# Patient Record
Sex: MALE | Race: WHITE | Employment: OTHER | RURAL
[De-identification: names, ages, dates, MRNs, and addresses within clinical notes are randomized per-mention and may not be internally consistent; named-entity substitution may affect disease eponyms.]

---

## 2017-07-06 ENCOUNTER — OFFICE VISIT (OUTPATIENT)
Dept: FAMILY MEDICINE CLINIC | Age: 77
End: 2017-07-06

## 2017-07-06 VITALS
TEMPERATURE: 96.2 F | DIASTOLIC BLOOD PRESSURE: 74 MMHG | BODY MASS INDEX: 23.34 KG/M2 | WEIGHT: 157.6 LBS | HEIGHT: 69 IN | OXYGEN SATURATION: 99 % | RESPIRATION RATE: 18 BRPM | SYSTOLIC BLOOD PRESSURE: 147 MMHG | HEART RATE: 51 BPM

## 2017-07-06 DIAGNOSIS — L57.0 AK (ACTINIC KERATOSIS): Primary | ICD-10-CM

## 2017-07-06 NOTE — PROGRESS NOTES
Chief Complaint   Patient presents with    Facial Injury     spot on right side of face. Noticed about a year ago, hasn't grown or decreased in size. Has tried OTC creams but nothing seems to help. HPI:      Samantha Dailey is a 78 y.o. male. Hx of prostate cancer. He had a robot-assisted laparoscopic prostatectomy  with pelvic lymph node dissection in 2010. New Issues:  Has a spot on right side of face. Noticed about a year ago, hasn't grown or decreased in size. Has tried OTC creams but nothing seems to help. Has not seen a dermatologist s/t difficulty getting an appointment. Allergies   Allergen Reactions    Sulfa (Sulfonamide Antibiotics) Anaphylaxis       Current Outpatient Prescriptions   Medication Sig    ibuprofen (MOTRIN) 200 mg tablet Take  by mouth every six (6) hours as needed for Pain. No current facility-administered medications for this visit. Past Medical History:   Diagnosis Date    Cancer [facility]) 2011    Prostate       Past Surgical History:   Procedure Laterality Date    HX COLONOSCOPY  2000    HX HERNIA REPAIR      HX PROSTATECTOMY      HX TONSILLECTOMY         Social History     Social History    Marital status:      Spouse name: N/A    Number of children: N/A    Years of education: N/A     Social History Main Topics    Smoking status: Never Smoker    Smokeless tobacco: None    Alcohol use 0.0 oz/week     0 Standard drinks or equivalent per week    Drug use: No    Sexual activity: Not Asked     Other Topics Concern    None     Social History Narrative       No family history on file. Above history reviewed. ROS:  Denies fever, chills, cough, chest pain, SOB,  nausea, vomiting, or diarrhea. Denies wt loss, wt gain, hemoptysis, hematochezia or melena.     Physical Examination:    /74 (BP 1 Location: Right arm, BP Patient Position: Sitting)  Pulse (!) 51  Temp 96.2 °F (35.7 °C) (Oral)   Resp 18  Ht 5' 8.5\" (1.74 m)  Wt 157 lb 9.6 oz (71.5 kg)  SpO2 99%  BMI 23.61 kg/m2    General: Alert and Ox3, Fluent speech  Neurologic:  Ambulatory without assist, CN 2-12 grossly intact. Moves all extremities. Skin: Red, rough lesion right upper cheek near temple. ASSESSMENT AND PLAN:     1. AK (actinic keratosis)  Procedure Note:  Cryotherapy for the treatment of a single Actinic Keratosis    The risks, benefits and alternatives to treatment were carefully explained to the patient who voiced understanding and  desires to proceed. With the patient's verbal permission, a single actinic keratosis was treated with 2 applications of Liquid Nitrogen. The patient tolerated the procedure well and there were no complications.   The patient was instructed to RTC for follow up if redness, swelling or new lesions emerge.  - Zack Hernandez. Dimitry 139, NP

## 2017-07-06 NOTE — MR AVS SNAPSHOT
Visit Information Date & Time Provider Department Dept. Phone Encounter #  
 7/6/2017  7:30 AM Ophelia Rowell NP [address] [phone] [id number] Upcoming Health Maintenance Date Due  
 GLAUCOMA SCREENING Q2Y 4/29/2007 Pneumococcal 65+ Low/Medium Risk (1 of 2 - PCV13) 4/29/2007 MEDICARE YEARLY EXAM 4/29/2007 INFLUENZA AGE 9 TO ADULT 8/1/2017 DTaP/Tdap/Td series (2 - Td) 7/6/2027 Allergies as of 7/6/2017  Review Complete On: 7/6/2017 By: Ophelia Rowell NP Severity Noted Reaction Type Reactions Sulfa (Sulfonamide Antibiotics) High 02/10/2016    Anaphylaxis Current Immunizations  Reviewed on 7/6/2017 Name Date Td 9/1/2011 Zoster Vaccine, Live 7/16/2011 Reviewed by Vickey Tamayo on 7/6/2017 at  7:40 AM  
Vitals BP Pulse Temp Resp Height(growth percentile) Weight(growth percentile) 147/74 (BP 1 Location: Right arm, BP Patient Position: Sitting) (!) 51 96.2 °F (35.7 °C) (Oral) 18 5' 8.5\" (1.74 m) 157 lb 9.6 oz (71.5 kg) SpO2 BMI Smoking Status 99% 23.61 kg/m2 Never Smoker BMI and BSA Data Body Mass Index Body Surface Area  
 23.61 kg/m 2 1.86 m 2 Your Updated Medication List  
  
   
This list is accurate as of: 7/6/17  8:00 AM.  Always use your most recent med list.  
  
  
  
  
 ibuprofen 200 mg tablet Commonly known as:  MOTRIN Take  by mouth every six (6) hours as needed for Pain. Introducing [facility] & HEALTH SERVICES! Erika Gilbert introduces Rypple patient portal. Now you can access parts of your medical record, email your doctor's office, and request medication refills online. 1. In your internet browser, go to [email or web address] 2. Click on the First Time User? Click Here link in the Sign In box. You will see the New Member Sign Up page. 3. Enter your Rypple Access Code exactly as it appears below.  You will not need to use this code after youve completed the sign-up process. If you do not sign up before the expiration date, you must request a new code. · MyShape Access Code: [email or web address] Expires: 10/4/2017  8:00 AM 
 
4. Enter the last four digits of your Social Security Number (xxxx) and Date of Birth (mm/dd/yyyy) as indicated and click Submit. You will be taken to the next sign-up page. 5. Create a MyShape ID. This will be your MyShape login ID and cannot be changed, so think of one that is secure and easy to remember. 6. Create a MyShape password. You can change your password at any time. 7. Enter your Password Reset Question and Answer. This can be used at a later time if you forget your password. 8. Enter your e-mail address. You will receive e-mail notification when new information is available in [address] 9. Click Sign Up. You can now view and download portions of your medical record. 10. Click the Download Summary menu link to download a portable copy of your medical information. If you have questions, please visit the Frequently Asked Questions section of the MyShape website. Remember, MyShape is NOT to be used for urgent needs. For medical emergencies, dial 911. Now available from your iPhone and Android! Please provide this summary of care documentation to your next provider. Your primary care clinician is listed as Chilo Crooks. If you have any questions after today's visit, please call [phone].

## 2017-07-06 NOTE — ACP (ADVANCE CARE PLANNING)
Patient states he has an Advanced Directive and will bring it in on his next visit to be scanned into his chart.

## 2017-09-18 ENCOUNTER — OFFICE VISIT (OUTPATIENT)
Dept: FAMILY MEDICINE CLINIC | Age: 77
End: 2017-09-18

## 2017-09-18 VITALS
HEART RATE: 59 BPM | BODY MASS INDEX: 23.61 KG/M2 | OXYGEN SATURATION: 99 % | DIASTOLIC BLOOD PRESSURE: 70 MMHG | SYSTOLIC BLOOD PRESSURE: 137 MMHG | RESPIRATION RATE: 18 BRPM | WEIGHT: 157.6 LBS

## 2017-09-18 DIAGNOSIS — R79.82 ELEVATED C-REACTIVE PROTEIN (CRP): ICD-10-CM

## 2017-09-18 DIAGNOSIS — M25.50 ARTHRALGIA, UNSPECIFIED JOINT: Primary | ICD-10-CM

## 2017-09-18 NOTE — MR AVS SNAPSHOT
Visit Information Date & Time Provider Department Dept. Phone Encounter #  
 9/18/2017  1:30 PM Dylan Corbin NP [address] [facility] [id number] Follow-up Instructions Return if symptoms worsen or fail to improve. Follow-up and Disposition History Upcoming Health Maintenance Date Due  
 GLAUCOMA SCREENING Q2Y 4/29/2007 MEDICARE YEARLY EXAM 4/29/2007 INFLUENZA AGE 9 TO ADULT 10/1/2017* Pneumococcal 65+ Low/Medium Risk (1 of 2 - PCV13) 11/13/2017* DTaP/Tdap/Td series (2 - Td) 7/6/2027 *Topic was postponed. The date shown is not the original due date. Allergies as of 9/18/2017  Review Complete On: 9/18/2017 By: Dylan Corbin NP Severity Noted Reaction Type Reactions Sulfa (Sulfonamide Antibiotics) High 02/10/2016    Anaphylaxis Current Immunizations  Reviewed on 9/18/2017 Name Date Td 9/1/2011 Zoster Vaccine, Live 7/16/2011 Reviewed by Manjinder Proctor on 9/18/2017 at  1:28 PM  
You Were Diagnosed With   
  
 Codes Comments Arthralgia, unspecified joint    -  Primary ICD-10-CM: M25.50 ICD-9-CM: 719.40 Vitals BP Pulse Resp Weight(growth percentile) SpO2 BMI  
 137/70 (BP 1 Location: Right arm, BP Patient Position: Sitting) (!) 59 18 157 lb 9.6 oz (71.5 kg) 99% 23.61 kg/m2 Smoking Status Never Smoker Vitals History BMI and BSA Data Body Mass Index Body Surface Area  
 23.61 kg/m 2 1.86 m 2 Your Updated Medication List  
  
   
This list is accurate as of: 9/18/17  2:37 PM.  Always use your most recent med list.  
  
  
  
  
 ibuprofen 200 mg tablet Commonly known as:  MOTRIN Take  by mouth every six (6) hours as needed for Pain. We Performed the Following C REACTIVE PROTEIN, QT [57856 CPT(R)] CBC WITH AUTOMATED DIFF [95984 CPT(R)] LYME AB TOTAL W/RFLX W BLOT [[id number] Custom] SED RATE (ESR) [id number] CPT(R)] Follow-up Instructions Return if symptoms worsen or fail to improve. Patient Instructions Joint Pain: Care Instructions Your Care Instructions Many people have small aches and pains from overuse or injury to muscles and joints. Joint injuries often happen during sports or recreation, work tasks, or projects around the home. An overuse injury can happen when you put too much stress on a joint or when you do an activity that stresses the joint over and over, such as using the computer or rowing a boat. You can take action at home to help your muscles and joints get better. You should feel better in 1 to 2 weeks, but it can take 3 months or more to heal completely. Follow-up care is a key part of your treatment and safety. Be sure to make and go to all appointments, and call your doctor if you are having problems. It's also a good idea to know your test results and keep a list of the medicines you take. How can you care for yourself at home? · Do not put weight on the injured joint for at least a day or two. · For the first day or two after an injury, do not take hot showers or baths, and do not use hot packs. The heat could make swelling worse. · Put ice or a cold pack on the sore joint for 10 to 20 minutes at a time. Try to do this every 1 to 2 hours for the next 3 days (when you are awake) or until the swelling goes down. Put a thin cloth between the ice and your skin. · Wrap the injury in an elastic bandage. Do not wrap it too tightly because this can cause more swelling. · Prop up the sore joint on a pillow when you ice it or anytime you sit or lie down during the next 3 days. Try to keep it above the level of your heart. This will help reduce swelling. · Take an over-the-counter pain medicine, such as acetaminophen (Tylenol), ibuprofen (Advil, Motrin), or naproxen (Aleve). Read and follow all instructions on the label. · After 1 or 2 days of rest, begin moving the joint gently. While the joint is still healing, you can begin to exercise using activities that do not strain or hurt the painful joint. When should you call for help? Call your doctor now or seek immediate medical care if: 
· You have signs of infection, such as: 
¨ Increased pain, swelling, warmth, and redness. ¨ Red streaks leading from the joint. ¨ A fever. Watch closely for changes in your health, and be sure to contact your doctor if: 
· Your movement or symptoms are not getting better after 1 to 2 weeks of home treatment. Where can you learn more? Go to http://joie-betty.info/. Enter P205 in the search box to learn more about \"Joint Pain: Care Instructions. \" Current as of: March 21, 2017 Content Version: 11.3 © 2211-5087 ParkVu. Care instructions adapted under license by [employer] (which disclaims liability or warranty for this information). If you have questions about a medical condition or this instruction, always ask your healthcare professional. [place] [address] any warranty or liability for your use of this information. If you have any questions regarding Cloud Floor, you may call Cloud Floor support at [phone]. Patient Instructions History Introducing [facility] & HEALTH SERVICES! [facility] introduces RuckPack patient portal. Now you can access parts of your medical record, email your doctor's office, and request medication refills online. 1. In your internet browser, go to [email or web address] 2. Click on the First Time User? Click Here link in the Sign In box. You will see the New Member Sign Up page. 3. Enter your RuckPack Access Code exactly as it appears below. You will not need to use this code after youve completed the sign-up process. If you do not sign up before the expiration date, you must request a new code. · Giiv Access Code: [email or web address] Expires: 10/4/2017  8:00 AM 
 
4. Enter the last four digits of your Social Security Number (xxxx) and Date of Birth (mm/dd/yyyy) as indicated and click Submit. You will be taken to the next sign-up page. 5. Create a Giiv ID. This will be your Giiv login ID and cannot be changed, so think of one that is secure and easy to remember. 6. Create a Giiv password. You can change your password at any time. 7. Enter your Password Reset Question and Answer. This can be used at a later time if you forget your password. 8. Enter your e-mail address. You will receive e-mail notification when new information is available in [address] 9. Click Sign Up. You can now view and download portions of your medical record. 10. Click the Download Summary menu link to download a portable copy of your medical information. If you have questions, please visit the Frequently Asked Questions section of the Giiv website. Remember, Giiv is NOT to be used for urgent needs. For medical emergencies, dial 911. Now available from your iPhone and Android! Please provide this summary of care documentation to your next provider. Lyme Disease Testing Disclaimer:   
 § 81.0-5686.7. (Expires July 1, 2018) Lyme disease testing information disclosure. A. Every licensee or his in-office designee who orders a laboratory test for the presence of Lyme disease shall provide to the patient or his legal representative the following written information: \"ACCORDING TO THE CENTERS FOR DISEASE CONTROL AND PREVENTION, AS OF 2011 LYME DISEASE IS THE SIXTH FASTEST GROWING DISEASE IN THE [place]. YOUR HEALTH CARE PROVIDER HAS ORDERED A LABORATORY TEST FOR THE PRESENCE OF LYME DISEASE FOR YOU.  CURRENT LABORATORY TESTING FOR LYME DISEASE CAN BE PROBLEMATIC AND STANDARD LABORATORY TESTS OFTEN RESULT IN FALSE NEGATIVE AND FALSE POSITIVE RESULTS, AND IF DONE TOO EARLY, YOU MAY NOT HAVE PRODUCED ENOUGH ANTIBODIES TO BE CONSIDERED POSITIVE BECAUSE YOUR IMMUNE RESPONSE REQUIRES TIME TO DEVELOP ANTIBODIES. IF YOU ARE TESTED FOR LYME DISEASE, AND THE RESULTS ARE NEGATIVE, THIS DOES NOT NECESSARILY MEAN YOU DO NOT HAVE LYME DISEASE. IF YOU CONTINUE TO EXPERIENCE SYMPTOMS, YOU SHOULD CONTACT YOUR HEALTH CARE PROVIDER AND INQUIRE ABOUT THE APPROPRIATENESS OF RETESTING OR ADDITIONAL TREATMENT. \"  
B. Licensees shall be immune from civil liability for the provision of the written information required by this section absent gross negligence or willful misconduct. Your primary care clinician is listed as Sondra Singleton. If you have any questions after today's visit, please call [phone].

## 2017-09-18 NOTE — PROGRESS NOTES
Chief Complaint   Patient presents with    Muscle Pain     Chills, loss of appetite, chills, joints aching, slight sore throat. Breaks out in a sweat randomly. HPI:      Oj Werner is a 78 y.o. male. Hx of prostate cancer. He had a robot-assisted laparoscopic prostatectomy  with pelvic lymph node dissection in 2010. New Issues:  C/O achy joints, chills, sweats and loss of appetite since last Monday afternoon. He has a stiff neck, sore elbows, knuckles, ankles and knees. Has had multiple tick bites over the past year. Allergies   Allergen Reactions    Sulfa (Sulfonamide Antibiotics) Anaphylaxis       Current Outpatient Prescriptions   Medication Sig    ibuprofen (MOTRIN) 200 mg tablet Take  by mouth every six (6) hours as needed for Pain. No current facility-administered medications for this visit. Past Medical History:   Diagnosis Date    Cancer [facility]) 2011    Prostate       Past Surgical History:   Procedure Laterality Date    HX COLONOSCOPY  2000    HX HERNIA REPAIR      HX PROSTATECTOMY      HX TONSILLECTOMY         Social History     Social History    Marital status:      Spouse name: N/A    Number of children: N/A    Years of education: N/A     Social History Main Topics    Smoking status: Never Smoker    Smokeless tobacco: None    Alcohol use 0.0 oz/week     0 Standard drinks or equivalent per week    Drug use: No    Sexual activity: Not Asked     Other Topics Concern    None     Social History Narrative       No family history on file. Above history reviewed. ROS:  Denies fever, chills, cough, chest pain, SOB,  nausea, vomiting, or diarrhea. Denies wt loss, wt gain, hemoptysis, hematochezia or melena.     Physical Examination:    /70 (BP 1 Location: Right arm, BP Patient Position: Sitting)  Pulse (!) 59  Resp 18  Wt 157 lb 9.6 oz (71.5 kg)  SpO2 99%  BMI 23.61 kg/m2    General: Alert and Ox3, Fluent speech  Neck:  Supple, no adenopathy, JVD, mass or bruit  Chest:  Clear to Ausculation, without wheezes, rales, rubs or ronchi  Cardiac: RRR  Extremities:  No cyanosis, clubbing or edema  Neurologic:  Ambulatory without assist, CN 2-12 grossly intact. Moves all extremities. Skin: no rash  Lymphadenopathy: no cervical or supraclavicular nodes    ASSESSMENT AND PLAN:     1. Arthralgia, unspecified joint  Ruling out underlying causes. May be s/t arthritis.    - SED RATE (ESR)  - C REACTIVE PROTEIN, QT  - LYME AB TOTAL W/RFLX W BLOT  - CBC WITH AUTOMATED DIFF     RTC PRN    Liu Coulter, NP

## 2017-09-18 NOTE — PATIENT INSTRUCTIONS
Joint Pain: Care Instructions  Your Care Instructions  Many people have small aches and pains from overuse or injury to muscles and joints. Joint injuries often happen during sports or recreation, work tasks, or projects around the home. An overuse injury can happen when you put too much stress on a joint or when you do an activity that stresses the joint over and over, such as using the computer or rowing a boat. You can take action at home to help your muscles and joints get better. You should feel better in 1 to 2 weeks, but it can take 3 months or more to heal completely. Follow-up care is a key part of your treatment and safety. Be sure to make and go to all appointments, and call your doctor if you are having problems. It's also a good idea to know your test results and keep a list of the medicines you take. How can you care for yourself at home? · Do not put weight on the injured joint for at least a day or two. · For the first day or two after an injury, do not take hot showers or baths, and do not use hot packs. The heat could make swelling worse. · Put ice or a cold pack on the sore joint for 10 to 20 minutes at a time. Try to do this every 1 to 2 hours for the next 3 days (when you are awake) or until the swelling goes down. Put a thin cloth between the ice and your skin. · Wrap the injury in an elastic bandage. Do not wrap it too tightly because this can cause more swelling. · Prop up the sore joint on a pillow when you ice it or anytime you sit or lie down during the next 3 days. Try to keep it above the level of your heart. This will help reduce swelling. · Take an over-the-counter pain medicine, such as acetaminophen (Tylenol), ibuprofen (Advil, Motrin), or naproxen (Aleve). Read and follow all instructions on the label. · After 1 or 2 days of rest, begin moving the joint gently.  While the joint is still healing, you can begin to exercise using activities that do not strain or hurt the painful joint. When should you call for help? Call your doctor now or seek immediate medical care if:  · You have signs of infection, such as:  ¨ Increased pain, swelling, warmth, and redness. ¨ Red streaks leading from the joint. ¨ A fever. Watch closely for changes in your health, and be sure to contact your doctor if:  · Your movement or symptoms are not getting better after 1 to 2 weeks of home treatment. Where can you learn more? Go to http://joie-betty.info/. Enter P205 in the search box to learn more about \"Joint Pain: Care Instructions. \"  Current as of: March 21, 2017  Content Version: 11.3  © 5318-1861 [employer] Vega. Care instructions adapted under license by [employer]Pe[employer] (which disclaims liability or warranty for this information). If you have questions about a medical condition or this instruction, always ask your healthcare professional. [facility] any warranty or liability for your use of this information. If you have any questions regarding [employer], you may call [employer] support at [phone].

## 2017-09-20 LAB
B BURGDOR IGG+IGM SER-ACNC: <0.91 ISR (ref 0–0.9)
BASOPHILS # BLD AUTO: 0 X10E3/UL (ref 0–0.2)
BASOPHILS NFR BLD AUTO: 1 %
CRP SERPL-MCNC: 11.8 MG/L (ref 0–4.9)
EOSINOPHIL # BLD AUTO: 0.1 X10E3/UL (ref 0–0.4)
EOSINOPHIL NFR BLD AUTO: 2 %
ERYTHROCYTE [DISTWIDTH] IN BLOOD BY AUTOMATED COUNT: 14.4 % (ref 12.3–15.4)
ERYTHROCYTE [SEDIMENTATION RATE] IN BLOOD BY WESTERGREN METHOD: 5 MM/HR (ref 0–30)
HCT VFR BLD AUTO: 39.8 % (ref 37.5–51)
HGB BLD-MCNC: 13.6 G/DL (ref 12.6–17.7)
IMM GRANULOCYTES # BLD: 0 X10E3/UL (ref 0–0.1)
IMM GRANULOCYTES NFR BLD: 1 %
LYMPHOCYTES # BLD AUTO: 1.7 X10E3/UL (ref 0.7–3.1)
LYMPHOCYTES NFR BLD AUTO: 40 %
MCH RBC QN AUTO: 29.1 PG (ref 26.6–33)
MCHC RBC AUTO-ENTMCNC: 34.2 G/DL (ref 31.5–35.7)
MCV RBC AUTO: 85 FL (ref 79–97)
MONOCYTES # BLD AUTO: 0.5 X10E3/UL (ref 0.1–0.9)
MONOCYTES NFR BLD AUTO: 13 %
NEUTROPHILS # BLD AUTO: 1.8 X10E3/UL (ref 1.4–7)
NEUTROPHILS NFR BLD AUTO: 43 %
PLATELET # BLD AUTO: 163 X10E3/UL (ref 150–379)
RBC # BLD AUTO: 4.67 X10E6/UL (ref 4.14–5.8)
WBC # BLD AUTO: 4.1 X10E3/UL (ref 3.4–10.8)

## 2017-09-22 ENCOUNTER — TELEPHONE (OUTPATIENT)
Dept: FAMILY MEDICINE CLINIC | Age: 77
End: 2017-09-22

## 2017-09-22 NOTE — PROGRESS NOTES
Spoke to patient's wife via telphone:  Blood count was normal.  He is negative for Lyme's disease, but one of the inflammation markers was elevated. I am putting in more lab orders for his to have checked at his convenience. These include a parvovirus antibody, uric acid level, RA panel and ADRIÁN. We would consider possible treatment with steroids after we get results back depending on his discomfort level. If any particular joint seems worse, we would consider plain film x-rays.

## 2017-09-25 ENCOUNTER — LAB ONLY (OUTPATIENT)
Dept: FAMILY MEDICINE CLINIC | Age: 77
End: 2017-09-25

## 2017-09-25 DIAGNOSIS — M25.50 ARTHRALGIA, UNSPECIFIED JOINT: ICD-10-CM

## 2017-09-25 DIAGNOSIS — R79.82 ELEVATED C-REACTIVE PROTEIN (CRP): ICD-10-CM

## 2017-09-25 NOTE — MR AVS SNAPSHOT
Visit Information Date & Time Provider Department Dept. Phone Encounter #  
 9/25/2017  9:25 AM [facility] NURSE Cristian Khalil [id number] Upcoming Health Maintenance Date Due  
 GLAUCOMA SCREENING Q2Y 4/29/2007 MEDICARE YEARLY EXAM 4/29/2007 INFLUENZA AGE 9 TO ADULT 10/1/2017* Pneumococcal 65+ Low/Medium Risk (1 of 2 - PCV13) 11/13/2017* DTaP/Tdap/Td series (2 - Td) 7/6/2027 *Topic was postponed. The date shown is not the original due date. Allergies as of 9/25/2017  Review Complete On: 9/18/2017 By: Aliya Hays NP Severity Noted Reaction Type Reactions Sulfa (Sulfonamide Antibiotics) High 02/10/2016    Anaphylaxis Current Immunizations  Reviewed on 9/18/2017 Name Date Td 9/1/2011 Zoster Vaccine, Live 7/16/2011 Not reviewed this visit You Were Diagnosed With   
  
 Codes Comments Arthralgia, unspecified joint     ICD-10-CM: M25.50 ICD-9-CM: 719.40 Elevated C-reactive protein (CRP)     ICD-10-CM: X01.64 ICD-9-CM: 790.95 Vitals Smoking Status Never Smoker Your Updated Medication List  
  
   
This list is accurate as of: 9/25/17  9:48 AM.  Always use your most recent med list.  
  
  
  
  
 ibuprofen 200 mg tablet Commonly known as:  MOTRIN Take  by mouth every six (6) hours as needed for Pain. We Performed the Following ADRIÁN W/REFLEX [67174 CPT(R)] PARVOVIRUS B19 AB [98264 CPT(R)] Leandrew [place] [[id number] Custom] URIC ACID [id number] CPT(R)] Introducing [facility] & HEALTH SERVICES! Massiel Larios introduces Pollen patient portal. Now you can access parts of your medical record, email your doctor's office, and request medication refills online. 1. In your internet browser, go to [email or web address] 2. Click on the First Time User? Click Here link in the Sign In box. You will see the New Member Sign Up page. 3. Enter your riskmethods Access Code exactly as it appears below. You will not need to use this code after youve completed the sign-up process. If you do not sign up before the expiration date, you must request a new code. · riskmethods Access Code: [email or web address] Expires: 10/4/2017  8:00 AM 
 
4. Enter the last four digits of your Social Security Number (xxxx) and Date of Birth (mm/dd/yyyy) as indicated and click Submit. You will be taken to the next sign-up page. 5. Create a riskmethods ID. This will be your riskmethods login ID and cannot be changed, so think of one that is secure and easy to remember. 6. Create a riskmethods password. You can change your password at any time. 7. Enter your Password Reset Question and Answer. This can be used at a later time if you forget your password. 8. Enter your e-mail address. You will receive e-mail notification when new information is available in [address] 9. Click Sign Up. You can now view and download portions of your medical record. 10. Click the Download Summary menu link to download a portable copy of your medical information. If you have questions, please visit the Frequently Asked Questions section of the riskmethods website. Remember, riskmethods is NOT to be used for urgent needs. For medical emergencies, dial 911. Now available from your iPhone and Android! Please provide this summary of care documentation to your next provider. Your primary care clinician is listed as Charla Macias. If you have any questions after today's visit, please call [phone].

## 2017-09-29 ENCOUNTER — TELEPHONE (OUTPATIENT)
Dept: FAMILY MEDICINE CLINIC | Age: 77
End: 2017-09-29

## 2017-09-30 LAB
14.3.3 ETA, RHEUM. ARTHRITIS: <0.2 NG/ML
ANA SER QL: NEGATIVE
B19V IGG SER IA-ACNC: 8.2 INDEX (ref 0–0.8)
B19V IGM SER IA-ACNC: 0.3 INDEX (ref 0–0.8)
CCP IGA+IGG SERPL IA-ACNC: <20 UNITS
RHEUMATOID FACT SERPL-ACNC: <14 UNITS/ML
URATE SERPL-MCNC: 5.1 MG/DL (ref 3.7–8.6)

## 2017-10-02 NOTE — PROGRESS NOTES
ADRIÁN, rheumatoid factors and uric acid levels are normal.  Positive for parvovirus. Avoid pregnant women for the next 2 weeks.

## 2019-05-09 ENCOUNTER — OFFICE VISIT (OUTPATIENT)
Dept: SURGERY | Age: 79
End: 2019-05-09

## 2019-05-09 VITALS
HEIGHT: 69 IN | SYSTOLIC BLOOD PRESSURE: 145 MMHG | DIASTOLIC BLOOD PRESSURE: 79 MMHG | BODY MASS INDEX: 23.8 KG/M2 | WEIGHT: 160.7 LBS | HEART RATE: 62 BPM

## 2019-05-09 DIAGNOSIS — R07.2 SUBSTERNAL CHEST PAIN: ICD-10-CM

## 2019-05-09 DIAGNOSIS — Z12.11 SCREEN FOR COLON CANCER: ICD-10-CM

## 2019-05-09 DIAGNOSIS — K21.9 GASTROESOPHAGEAL REFLUX DISEASE, ESOPHAGITIS PRESENCE NOT SPECIFIED: Primary | ICD-10-CM

## 2019-05-09 RX ORDER — SODIUM CHLORIDE, SODIUM LACTATE, POTASSIUM CHLORIDE, CALCIUM CHLORIDE 600; 310; 30; 20 MG/100ML; MG/100ML; MG/100ML; MG/100ML
200 INJECTION, SOLUTION INTRAVENOUS CONTINUOUS
Status: CANCELLED | OUTPATIENT
Start: 2019-05-09 | End: 2019-05-10

## 2019-05-09 RX ORDER — BISACODYL 5 MG
5 TABLET, DELAYED RELEASE (ENTERIC COATED) ORAL ONCE
Qty: 1 TAB | Refills: 0 | Status: SHIPPED | OUTPATIENT
Start: 2019-05-09 | End: 2019-05-09

## 2019-05-09 RX ORDER — POLYETHYLENE GLYCOL 3350, SODIUM CHLORIDE, SODIUM BICARBONATE, POTASSIUM CHLORIDE 420; 11.2; 5.72; 1.48 G/4L; G/4L; G/4L; G/4L
4000 POWDER, FOR SOLUTION ORAL
Qty: 1 BOTTLE | Refills: 0 | Status: SHIPPED | OUTPATIENT
Start: 2019-05-09 | End: 2019-05-09

## 2019-05-09 NOTE — PROGRESS NOTES
[address] [facility] SURGICAL  
[address] [facility], [address] [place] [address] Phone: [phone] Fax: [phone] HISTORY AND PHYSICAL EXAM 
 
NAME: Lee Mercer : 1940 Chief Complaint:  
Chief Complaint Patient presents with  GERD Substernal chest pain Screening colonoscopy History: Lee Mercer is a 70 y.o. male who was referred for EGD for GERD symptoms. He himself is not convinced that it is acid reflux but his symptoms are substernal pain. He said he has not had a cardiac work-up but but he did have an EKG which was normal.  When he eats he gets a tight feeling in his sternal notch. There is no particular type of food that is affected but is mainly solid. This can occur as often as weekly. He tried a 30-day treatment of omeprazole 20 mg without relief. He is currently on no meds for this at this point. He does drink 2 to 3 cups of coffee a day and daily orange juice. He denies frequent alcohol use and denies smoking. He does take occasional NSAIDs. No history of peptic ulcer disease. Last colonoscopy was over 20 years ago and was normal.  Bowels are regular and he takes no medication for them. Family history is negative for colon cancer or stomach cancer. Past Medical History:  
Diagnosis Date  Cancer [facility])  Prostate Past Surgical History:  
Procedure Laterality Date  HX COLONOSCOPY    HX HERNIA REPAIR    
 HX PROSTATECTOMY  HX TONSILLECTOMY Current Outpatient Medications Medication Sig Dispense Refill  bisacodyl (DULCOLAX, BISACODYL,) 5 mg EC tablet Take 1 Tab by mouth once for 1 dose. 1 Tab 0  peg-electrolyte soln (GAVILYTE-N) 420 gram solution Take 4,000 mL by mouth now for 1 dose. Indications: emptying of the bowel 1 Bottle 0 Allergies Allergen Reactions  Sulfa (Sulfonamide Antibiotics) Anaphylaxis Social History Socioeconomic History  Marital status:   
 Spouse name: Not on file  Number of children: Not on file  Years of education: Not on file  Highest education level: Not on file Occupational History  Not on file Social Needs  Financial resource strain: Not on file  Food insecurity:  
  Worry: Not on file Inability: Not on file  Transportation needs:  
  Medical: Not on file Non-medical: Not on file Tobacco Use  Smoking status: Never Smoker  Smokeless tobacco: Never Used Substance and Sexual Activity  Alcohol use: Yes Alcohol/week: 0.0 oz  Drug use: No  
 Sexual activity: Not on file Lifestyle  Physical activity:  
  Days per week: Not on file Minutes per session: Not on file  Stress: Not on file Relationships  Social connections:  
  Talks on phone: Not on file Gets together: Not on file Attends Nondenominational service: Not on file Active member of club or organization: Not on file Attends meetings of clubs or organizations: Not on file Relationship status: Not on file  Intimate partner violence:  
  Fear of current or ex partner: Not on file Emotionally abused: Not on file Physically abused: Not on file Forced sexual activity: Not on file Other Topics Concern  Not on file Social History Narrative  Not on file Family History Problem Relation Age of Onset  No Known Problems Mother There is no problem list on file for this patient. Review of Systems: 
 
Review of Systems Constitutional: Negative for chills, fever, malaise/fatigue and weight loss. HENT: Negative for congestion, ear discharge, ear pain, hearing loss, nosebleeds, sore throat and tinnitus. Eyes: Negative for blurred vision, double vision, pain, discharge and redness. Respiratory: Negative for cough, sputum production, shortness of breath and wheezing. Cardiovascular: Positive for chest pain. Negative for palpitations and leg swelling. Gastrointestinal: Positive for heartburn. Negative for blood in stool, constipation, diarrhea, melena, nausea and vomiting. Genitourinary: Negative for frequency, hematuria and urgency. Musculoskeletal: Negative for back pain, joint pain and neck pain. Skin: Negative for itching and rash. Neurological: Negative for dizziness, tremors, focal weakness, seizures, weakness and headaches. Endo/Heme/Allergies: Does not bruise/bleed easily. Psychiatric/Behavioral: Negative for depression and memory loss. The patient is not nervous/anxious and does not have insomnia. Physical Exam: 
Visit Vitals /79 (BP 1 Location: Left arm, BP Patient Position: Sitting) Pulse 62 Ht 5' 9\" (1.753 m) Wt 160 lb 11.2 oz (72.9 kg) BMI 23.73 kg/m² Physical Exam  
Constitutional: He is oriented to person, place, and time. He appears well-developed and well-nourished. No distress. HENT:  
Head: Normocephalic and atraumatic. Right Ear: External ear normal.  
Left Ear: External ear normal.  
Nose: Nose normal.  
Mouth/Throat: Oropharynx is clear and moist. No oropharyngeal exudate. Eyes: Pupils are equal, round, and reactive to light. EOM are normal. Right eye exhibits no discharge. Left eye exhibits no discharge. No scleral icterus. Neck: Neck supple. No tracheal deviation present. No thyromegaly present. Cardiovascular: Normal rate, regular rhythm and normal heart sounds. Exam reveals no friction rub. No murmur heard. Pulmonary/Chest: Effort normal and breath sounds normal. No respiratory distress. He has no wheezes. He has no rales. Abdominal: Soft. He exhibits no distension and no mass. There is no tenderness. Musculoskeletal: Normal range of motion. Lymphadenopathy:  
  He has no cervical adenopathy. Neurological: He is alert and oriented to person, place, and time. Skin: Skin is warm and dry. No rash noted. No erythema. Psychiatric: He has a normal mood and affect. His behavior is normal. Judgment and thought content normal.  
 
 
 
Impression: 
GERD symptoms not responding to meds Need for screening colonoscopy Plan: 
EGD/colonoscopy on 5/29/19 Risks and complications were explained to patient and they voiced understanding.  
 
 
Dany Gan M.D.

## 2019-05-29 PROBLEM — K21.9 GERD (GASTROESOPHAGEAL REFLUX DISEASE): Status: ACTIVE | Noted: 2019-05-29

## 2019-05-29 PROBLEM — K44.9 HIATAL HERNIA: Status: ACTIVE | Noted: 2019-05-29

## 2019-05-29 PROBLEM — Z12.11 SCREEN FOR COLON CANCER: Status: RESOLVED | Noted: 2019-05-29 | Resolved: 2019-05-29

## 2019-05-29 PROBLEM — Z12.11 SCREEN FOR COLON CANCER: Status: ACTIVE | Noted: 2019-05-29

## 2019-05-29 PROBLEM — K57.30 DIVERTICULA OF COLON: Status: ACTIVE | Noted: 2019-05-29

## 2019-05-29 PROBLEM — K29.90 GASTRITIS AND DUODENITIS: Status: ACTIVE | Noted: 2019-05-29

## 2019-05-29 LAB — COLONOSCOPY, EXTERNAL: NORMAL

## 2019-06-13 ENCOUNTER — OFFICE VISIT (OUTPATIENT)
Dept: SURGERY | Age: 79
End: 2019-06-13

## 2019-06-13 VITALS
BODY MASS INDEX: 23.7 KG/M2 | HEIGHT: 69 IN | SYSTOLIC BLOOD PRESSURE: 140 MMHG | HEART RATE: 59 BPM | DIASTOLIC BLOOD PRESSURE: 61 MMHG | WEIGHT: 160 LBS

## 2019-06-13 DIAGNOSIS — K29.90 GASTRITIS AND DUODENITIS: Primary | ICD-10-CM

## 2019-06-13 DIAGNOSIS — K57.30 DIVERTICULA OF COLON: ICD-10-CM

## 2019-06-13 DIAGNOSIS — K31.7 GASTRIC POLYPS: ICD-10-CM

## 2019-06-13 DIAGNOSIS — K44.9 HIATAL HERNIA WITH GERD: ICD-10-CM

## 2019-06-13 DIAGNOSIS — K21.9 HIATAL HERNIA WITH GERD: ICD-10-CM

## 2019-06-13 RX ORDER — PANTOPRAZOLE SODIUM 40 MG/1
40 TABLET, DELAYED RELEASE ORAL DAILY
Qty: 30 TAB | Refills: 2 | Status: SHIPPED | OUTPATIENT
Start: 2019-06-13

## 2019-06-14 NOTE — PROGRESS NOTES
Follow up from colonoscopy/EGD  No c/o  Still with the tight substernal pain      Pathology:  Gastritis and duodenitis  Hiatal hernia  diverticula    Visit Vitals  /61 (BP 1 Location: Left arm, BP Patient Position: Sitting)   Pulse (!) 59   Ht 5' 9\" (1.753 m)   Wt 160 lb (72.6 kg)   BMI 23.63 kg/m²     WDWN patient in no acute distress. Pathophysiology and malignancy potential discussed. Pictures viewed and questions answered. High fiber diet suggested. Acid restrictions discussed. Will try Protonix 40 mg daily  Discussed techniques to help with substernal spasms    Time spent 15 minutes in discussion, over half the visit was in care coordination and patient counseling. Repeat scope in 10 years.

## 2021-04-07 ENCOUNTER — TRANSCRIBE ORDER (OUTPATIENT)
Dept: REGISTRATION | Age: 81
End: 2021-04-07

## 2021-04-07 ENCOUNTER — HOSPITAL ENCOUNTER (OUTPATIENT)
Dept: GENERAL RADIOLOGY | Age: 81
Discharge: HOME | End: 2021-04-07
Payer: COMMERCIAL

## 2021-04-07 DIAGNOSIS — M25.519 PAIN IN UNSPECIFIED SHOULDER: ICD-10-CM

## 2021-04-07 DIAGNOSIS — M25.519 PAIN IN UNSPECIFIED SHOULDER: Primary | ICD-10-CM

## 2021-04-07 PROCEDURE — 73030 X-RAY EXAM OF SHOULDER: CPT

## 2021-06-09 ENCOUNTER — TRANSCRIBE ORDER (OUTPATIENT)
Dept: REGISTRATION | Age: 81
End: 2021-06-09

## 2021-06-09 ENCOUNTER — HOSPITAL ENCOUNTER (OUTPATIENT)
Dept: GENERAL RADIOLOGY | Age: 81
Discharge: HOME | End: 2021-06-09
Payer: COMMERCIAL

## 2021-06-09 DIAGNOSIS — R05.9 COUGH: ICD-10-CM

## 2021-06-09 DIAGNOSIS — R05.9 COUGH: Primary | ICD-10-CM

## 2021-06-09 PROCEDURE — 71046 X-RAY EXAM CHEST 2 VIEWS: CPT

## 2021-07-08 ENCOUNTER — TRANSCRIBE ORDER (OUTPATIENT)
Dept: REGISTRATION | Age: 81
End: 2021-07-08

## 2021-07-08 ENCOUNTER — HOSPITAL ENCOUNTER (OUTPATIENT)
Dept: GENERAL RADIOLOGY | Age: 81
Discharge: HOME | End: 2021-07-08
Payer: COMMERCIAL

## 2021-07-08 DIAGNOSIS — M79.644 PAIN IN FINGER OF RIGHT HAND: ICD-10-CM

## 2021-07-08 DIAGNOSIS — M79.644 PAIN IN FINGER OF RIGHT HAND: Primary | ICD-10-CM

## 2021-07-08 PROCEDURE — 73140 X-RAY EXAM OF FINGER(S): CPT

## 2021-07-13 ENCOUNTER — HOSPITAL ENCOUNTER (OUTPATIENT)
Dept: GENERAL RADIOLOGY | Age: 81
Discharge: HOME | End: 2021-07-13
Payer: COMMERCIAL

## 2021-07-13 ENCOUNTER — TRANSCRIBE ORDER (OUTPATIENT)
Dept: REGISTRATION | Age: 81
End: 2021-07-13

## 2021-07-13 DIAGNOSIS — R07.2 PRECORDIAL PAIN: Primary | ICD-10-CM

## 2021-07-13 DIAGNOSIS — R07.2 PRECORDIAL PAIN: ICD-10-CM

## 2021-07-13 PROCEDURE — 71046 X-RAY EXAM CHEST 2 VIEWS: CPT

## 2021-07-14 ENCOUNTER — TRANSCRIBE ORDER (OUTPATIENT)
Dept: SCHEDULING | Age: 81
End: 2021-07-14

## 2021-07-14 DIAGNOSIS — R07.2 PRECORDIAL PAIN: Primary | ICD-10-CM

## 2021-07-26 ENCOUNTER — TELEPHONE (OUTPATIENT)
Dept: NON INVASIVE DIAGNOSTICS | Age: 81
End: 2021-07-26

## 2021-07-28 ENCOUNTER — HOSPITAL ENCOUNTER (OUTPATIENT)
Dept: NON INVASIVE DIAGNOSTICS | Age: 81
Discharge: HOME | End: 2021-07-28
Attending: INTERNAL MEDICINE
Payer: COMMERCIAL

## 2021-07-28 ENCOUNTER — HOSPITAL ENCOUNTER (OUTPATIENT)
Dept: NUCLEAR MEDICINE | Age: 81
Discharge: HOME | End: 2021-07-28
Attending: INTERNAL MEDICINE
Payer: COMMERCIAL

## 2021-07-28 DIAGNOSIS — R07.2 PRECORDIAL PAIN: ICD-10-CM

## 2021-07-28 LAB
STRESS ANGINA INDEX: 0
STRESS BASELINE DIAS BP: 75 MMHG
STRESS BASELINE HR: 62 BPM
STRESS BASELINE SYS BP: 147 MMHG
STRESS ESTIMATED WORKLOAD: 7 METS
STRESS EXERCISE DUR MIN: 6 MIN:SEC
STRESS PEAK DIAS BP: 90 MMHG
STRESS PEAK SYS BP: 199 MMHG
STRESS PERCENT HR ACHIEVED: 100 %
STRESS POST PEAK HR: 141 BPM
STRESS RATE PRESSURE PRODUCT: NORMAL BPM*MMHG
STRESS SR DUKE TREADMILL SCORE: 6
STRESS ST DEPRESSION: 0 MM
STRESS ST ELEVATION: 0 MM
STRESS TARGET HR: 141 BPM

## 2021-07-28 PROCEDURE — A9500 TC99M SESTAMIBI: HCPCS

## 2021-07-28 RX ORDER — TETRAKIS(2-METHOXYISOBUTYLISOCYANIDE)COPPER(I) TETRAFLUOROBORATE 1 MG/ML
11 INJECTION, POWDER, LYOPHILIZED, FOR SOLUTION INTRAVENOUS
Status: COMPLETED | OUTPATIENT
Start: 2021-07-28 | End: 2021-07-28

## 2021-07-28 RX ORDER — TETRAKIS(2-METHOXYISOBUTYLISOCYANIDE)COPPER(I) TETRAFLUOROBORATE 1 MG/ML
33 INJECTION, POWDER, LYOPHILIZED, FOR SOLUTION INTRAVENOUS
Status: COMPLETED | OUTPATIENT
Start: 2021-07-28 | End: 2021-07-28

## 2021-07-28 RX ADMIN — TETRAKIS(2-METHOXYISOBUTYLISOCYANIDE)COPPER(I) TETRAFLUOROBORATE 11 MILLICURIE: 1 INJECTION, POWDER, LYOPHILIZED, FOR SOLUTION INTRAVENOUS at 06:55

## 2021-07-28 RX ADMIN — TETRAKIS(2-METHOXYISOBUTYLISOCYANIDE)COPPER(I) TETRAFLUOROBORATE 33 MILLICURIE: 1 INJECTION, POWDER, LYOPHILIZED, FOR SOLUTION INTRAVENOUS at 08:05

## 2022-03-18 PROBLEM — K21.9 GERD (GASTROESOPHAGEAL REFLUX DISEASE): Status: ACTIVE | Noted: 2019-05-29

## 2022-03-18 PROBLEM — K44.9 HIATAL HERNIA: Status: ACTIVE | Noted: 2019-05-29

## 2022-03-19 PROBLEM — K57.30 DIVERTICULA OF COLON: Status: ACTIVE | Noted: 2019-05-29

## 2022-03-19 PROBLEM — K29.90 GASTRITIS AND DUODENITIS: Status: ACTIVE | Noted: 2019-05-29

## 2022-03-29 ENCOUNTER — TRANSCRIBE ORDER (OUTPATIENT)
Dept: REGISTRATION | Age: 82
End: 2022-03-29

## 2022-03-29 ENCOUNTER — HOSPITAL ENCOUNTER (OUTPATIENT)
Dept: GENERAL RADIOLOGY | Age: 82
Discharge: HOME | End: 2022-03-29
Payer: COMMERCIAL

## 2022-03-29 DIAGNOSIS — M54.40 LOW BACK PAIN WITH SCIATICA: ICD-10-CM

## 2022-03-29 DIAGNOSIS — M54.40 LOW BACK PAIN WITH SCIATICA: Primary | ICD-10-CM

## 2022-03-29 PROCEDURE — 72100 X-RAY EXAM L-S SPINE 2/3 VWS: CPT

## 2022-04-06 ENCOUNTER — TRANSCRIBE ORDER (OUTPATIENT)
Dept: SCHEDULING | Age: 82
End: 2022-04-06

## 2022-04-06 DIAGNOSIS — M54.50 LOW BACK PAIN: Primary | ICD-10-CM

## 2022-04-08 ENCOUNTER — HOSPITAL ENCOUNTER (OUTPATIENT)
Dept: MRI IMAGING | Age: 82
Discharge: HOME | End: 2022-04-08
Attending: INTERNAL MEDICINE
Payer: COMMERCIAL

## 2022-04-08 DIAGNOSIS — M54.50 LOW BACK PAIN: ICD-10-CM

## 2022-04-08 PROCEDURE — 72148 MRI LUMBAR SPINE W/O DYE: CPT

## 2025-07-28 ENCOUNTER — OFFICE VISIT (OUTPATIENT)
Age: 85
End: 2025-07-28
Payer: OTHER GOVERNMENT

## 2025-07-28 VITALS
TEMPERATURE: 97.5 F | HEART RATE: 65 BPM | BODY MASS INDEX: 23.62 KG/M2 | OXYGEN SATURATION: 94 % | RESPIRATION RATE: 18 BRPM | SYSTOLIC BLOOD PRESSURE: 135 MMHG | WEIGHT: 165 LBS | DIASTOLIC BLOOD PRESSURE: 72 MMHG | HEIGHT: 70 IN

## 2025-07-28 DIAGNOSIS — R07.89 OTHER CHEST PAIN: ICD-10-CM

## 2025-07-28 DIAGNOSIS — R13.10 DYSPHAGIA, UNSPECIFIED TYPE: Primary | ICD-10-CM

## 2025-07-28 PROCEDURE — 1123F ACP DISCUSS/DSCN MKR DOCD: CPT | Performed by: SURGERY

## 2025-07-28 PROCEDURE — 99203 OFFICE O/P NEW LOW 30 MIN: CPT | Performed by: SURGERY

## 2025-07-28 ASSESSMENT — PATIENT HEALTH QUESTIONNAIRE - PHQ9
SUM OF ALL RESPONSES TO PHQ QUESTIONS 1-9: 0
SUM OF ALL RESPONSES TO PHQ QUESTIONS 1-9: 0
2. FEELING DOWN, DEPRESSED OR HOPELESS: NOT AT ALL
SUM OF ALL RESPONSES TO PHQ QUESTIONS 1-9: 0
1. LITTLE INTEREST OR PLEASURE IN DOING THINGS: NOT AT ALL
SUM OF ALL RESPONSES TO PHQ QUESTIONS 1-9: 0

## 2025-07-28 NOTE — PROGRESS NOTES
Identified pt with two pt identifiers (name and ). Reviewed chart in preparation for visit and have obtained necessary documentation.    Severo Casillas is a 85 y.o. male New Patient  .    Vitals:    25 1437   BP: 135/72   BP Site: Right Upper Arm   Patient Position: Sitting   BP Cuff Size: Medium Adult   Pulse: 65   Resp: 18   Temp: 97.5 °F (36.4 °C)   TempSrc: Oral   SpO2: 94%   Weight: 74.8 kg (165 lb)   Height: 1.778 m (5' 10\")          1. Have you been to the ER, urgent care clinic since your last visit?  Hospitalized since your last visit?  no     2. Have you seen or consulted any other health care providers outside of the [facility] since your last visit?  Include any pap smears or colon screening.  yes - [facility]

## 2025-07-28 NOTE — PROGRESS NOTES
Severo Casillas is a 85 y.o. male who presents today with the following:  Chief Complaint   Patient presents with    New Patient       HPI    85-year-old male who presents as a referral by Dr. Lozano for evaluation of chest pain and dysphagia.  He states he can develop substernal chest pain when he is sitting down or occasionally when he is eating.  This has been present for years.  He had an EGD in May 2019 by Dr. Hooker with findings of a large hiatal hernia and gastritis and nodular duodenitis and mild esophagitis at a portion of the EG junction.  He states at that point he was not placed on any medications but over the last few years he was placed on Protonix by Dr. Lozano which he has been taking daily.  He started to read about it and felt that the risks with long-term treatment with this medication concerned him and stopped taking the medication.  He did state that he was having some improvement of his symptoms although not complete resolution on the Protonix.  Since he has discontinued the Protonix his symptoms have returned with greater severity.    He has had no unexpected weight loss.    He also has been having some dysphagia that he describes as difficulty swallowing solid foods that occurs near the bottom of his throat or his upper chest.  He typically will have to drink liquids and then food will pass.  He did not notice an improvement with this while he was on his Protonix.    He does not smoke and he may drink alcohol once a week.        Past Medical History:   Diagnosis Date    Cancer (HCC) 2011    Prostate       Past Surgical History:   Procedure Laterality Date    COLONOSCOPY N/A 5/29/2019    COLONOSCOPY performed by Tara Hooker MD at [facility] MAIN OR    COLONOSCOPY  06/2019    repeat 10 yrs    COLONOSCOPY  2000    HERNIA REPAIR      PROSTATECTOMY      TONSILLECTOMY         Social History     Socioeconomic History    Marital status:      Spouse name: Not on file    Number of children: Not on

## 2025-08-21 ENCOUNTER — HOSPITAL ENCOUNTER (OUTPATIENT)
Facility: HOSPITAL | Age: 85
Discharge: HOME | End: 2025-08-24
Attending: SURGERY
Payer: OTHER GOVERNMENT

## 2025-08-21 DIAGNOSIS — R07.89 OTHER CHEST PAIN: ICD-10-CM

## 2025-08-21 DIAGNOSIS — R13.10 DYSPHAGIA, UNSPECIFIED TYPE: ICD-10-CM

## 2025-08-21 PROCEDURE — 74240 X-RAY XM UPR GI TRC 1CNTRST: CPT

## 2025-09-02 ENCOUNTER — OFFICE VISIT (OUTPATIENT)
Age: 85
End: 2025-09-02
Payer: OTHER GOVERNMENT

## 2025-09-02 VITALS
WEIGHT: 165 LBS | OXYGEN SATURATION: 96 % | DIASTOLIC BLOOD PRESSURE: 66 MMHG | TEMPERATURE: 98.6 F | HEIGHT: 70 IN | BODY MASS INDEX: 23.62 KG/M2 | SYSTOLIC BLOOD PRESSURE: 121 MMHG | RESPIRATION RATE: 20 BRPM | HEART RATE: 63 BPM

## 2025-09-02 DIAGNOSIS — K21.9 GASTROESOPHAGEAL REFLUX DISEASE WITHOUT ESOPHAGITIS: Primary | ICD-10-CM

## 2025-09-02 PROCEDURE — 1123F ACP DISCUSS/DSCN MKR DOCD: CPT | Performed by: SURGERY

## 2025-09-02 PROCEDURE — 99213 OFFICE O/P EST LOW 20 MIN: CPT | Performed by: SURGERY

## 2025-09-02 ASSESSMENT — PATIENT HEALTH QUESTIONNAIRE - PHQ9
SUM OF ALL RESPONSES TO PHQ QUESTIONS 1-9: 0
2. FEELING DOWN, DEPRESSED OR HOPELESS: NOT AT ALL
SUM OF ALL RESPONSES TO PHQ QUESTIONS 1-9: 0
1. LITTLE INTEREST OR PLEASURE IN DOING THINGS: NOT AT ALL